# Patient Record
Sex: MALE | Race: BLACK OR AFRICAN AMERICAN | NOT HISPANIC OR LATINO | URBAN - METROPOLITAN AREA
[De-identification: names, ages, dates, MRNs, and addresses within clinical notes are randomized per-mention and may not be internally consistent; named-entity substitution may affect disease eponyms.]

---

## 2017-08-11 ENCOUNTER — EMERGENCY (EMERGENCY)
Facility: HOSPITAL | Age: 22
LOS: 1 days | Discharge: HOME | End: 2017-08-11
Attending: EMERGENCY MEDICINE | Admitting: EMERGENCY MEDICINE
Payer: COMMERCIAL

## 2017-08-11 VITALS
HEART RATE: 67 BPM | SYSTOLIC BLOOD PRESSURE: 134 MMHG | TEMPERATURE: 99 F | OXYGEN SATURATION: 100 % | RESPIRATION RATE: 18 BRPM | DIASTOLIC BLOOD PRESSURE: 72 MMHG

## 2017-08-11 VITALS
DIASTOLIC BLOOD PRESSURE: 68 MMHG | TEMPERATURE: 99 F | HEART RATE: 66 BPM | SYSTOLIC BLOOD PRESSURE: 136 MMHG | OXYGEN SATURATION: 100 % | RESPIRATION RATE: 16 BRPM

## 2017-08-11 PROCEDURE — 99285 EMERGENCY DEPT VISIT HI MDM: CPT

## 2017-08-11 PROCEDURE — 70450 CT HEAD/BRAIN W/O DYE: CPT | Mod: 26

## 2017-08-11 RX ORDER — METOCLOPRAMIDE HCL 10 MG
10 TABLET ORAL ONCE
Qty: 0 | Refills: 0 | Status: COMPLETED | OUTPATIENT
Start: 2017-08-11 | End: 2017-08-11

## 2017-08-11 RX ORDER — SODIUM CHLORIDE 9 MG/ML
1000 INJECTION INTRAMUSCULAR; INTRAVENOUS; SUBCUTANEOUS ONCE
Qty: 0 | Refills: 0 | Status: COMPLETED | OUTPATIENT
Start: 2017-08-11 | End: 2017-08-11

## 2017-08-11 RX ORDER — ACETAMINOPHEN 500 MG
650 TABLET ORAL ONCE
Qty: 0 | Refills: 0 | Status: COMPLETED | OUTPATIENT
Start: 2017-08-11 | End: 2017-08-11

## 2017-08-11 RX ORDER — KETOROLAC TROMETHAMINE 30 MG/ML
15 SYRINGE (ML) INJECTION ONCE
Qty: 0 | Refills: 0 | Status: DISCONTINUED | OUTPATIENT
Start: 2017-08-11 | End: 2017-08-11

## 2017-08-11 RX ADMIN — Medication 650 MILLIGRAM(S): at 13:27

## 2017-08-11 RX ADMIN — Medication 15 MILLIGRAM(S): at 13:31

## 2017-08-11 RX ADMIN — SODIUM CHLORIDE 1000 MILLILITER(S): 9 INJECTION INTRAMUSCULAR; INTRAVENOUS; SUBCUTANEOUS at 13:27

## 2017-08-11 RX ADMIN — Medication 10 MILLIGRAM(S): at 13:31

## 2017-08-11 NOTE — ED ADULT TRIAGE NOTE - CHIEF COMPLAINT QUOTE
c/o headache x past wk ,continuous,changing intensity. denies injury. pain to neck, denies visual problems.  states feels feverish,did not check temp. denies n,v

## 2017-08-11 NOTE — ED PROVIDER NOTE - OBJECTIVE STATEMENT
22M no PMH p/w 1 week constant waxing and waning bandlike headache extending from b/l temples around to occiput and occasional across shoulders/trapezieus. Subjective fever last night, but no documented fever, no NV, no visual changes, no neck pain or stiffness, no URI symptoms. Took 1 dose of 400mg ibuprofen last night without improvement so came to ED today.

## 2017-08-11 NOTE — ED PROVIDER NOTE - MEDICAL DECISION MAKING DETAILS
22M with suspected tension headache; will give symptomatic treatment. Will also obtain CT head noncontrast as this is new onset headache in patient who does not routinely get headaches to r/o mass as etiology of symptoms.

## 2017-08-11 NOTE — ED ADULT NURSE NOTE - OBJECTIVE STATEMENT
pt c.o headache in back of head over the last week. states it is getting worse. thinks he had a fever last night. denies vomiting, blurry vision and head trauma. c.o. feeling dizzy when he gets up in the morning. sl placed medicated as ordered. to ct then rw.

## 2017-08-11 NOTE — ED PROVIDER NOTE - ATTENDING CONTRIBUTION TO CARE
22M o/w healthy presents with headache.  Reports the pain has been ongoing for one week.  Tried motrin x1 yesterday without significant relief.  States HA is b/l temporal area radiating to occiput and down to b/l shoulders.  States pain is worse when he stands up. Reports subjective fever last night, no neck stiffness. No photophobia.  No nausea or vomiting. No dizziness, no numbness or paresthesias. On exam well appearing, nad, PERRL, EOMI,  mmm, OP clear, no meningismus, rrr, lungs clear, abd soft, no rash, 2+ pulses, no edema, alert and oriented, speech clear, no focal neuro deficits.

## 2017-08-12 ENCOUNTER — EMERGENCY (EMERGENCY)
Facility: HOSPITAL | Age: 22
LOS: 1 days | Discharge: HOME | End: 2017-08-12
Attending: EMERGENCY MEDICINE | Admitting: EMERGENCY MEDICINE
Payer: COMMERCIAL

## 2017-08-12 VITALS
DIASTOLIC BLOOD PRESSURE: 80 MMHG | RESPIRATION RATE: 18 BRPM | TEMPERATURE: 98 F | OXYGEN SATURATION: 100 % | SYSTOLIC BLOOD PRESSURE: 125 MMHG | HEART RATE: 59 BPM

## 2017-08-12 VITALS
TEMPERATURE: 98 F | OXYGEN SATURATION: 100 % | SYSTOLIC BLOOD PRESSURE: 150 MMHG | RESPIRATION RATE: 16 BRPM | DIASTOLIC BLOOD PRESSURE: 83 MMHG | HEART RATE: 69 BPM

## 2017-08-12 LAB
ALBUMIN SERPL ELPH-MCNC: 3.9 G/DL — SIGNIFICANT CHANGE UP (ref 3.3–5)
ALP SERPL-CCNC: 50 U/L — SIGNIFICANT CHANGE UP (ref 40–120)
ALT FLD-CCNC: 9 U/L — SIGNIFICANT CHANGE UP (ref 4–41)
AST SERPL-CCNC: 14 U/L — SIGNIFICANT CHANGE UP (ref 4–40)
BASOPHILS # BLD AUTO: 0.02 K/UL — SIGNIFICANT CHANGE UP (ref 0–0.2)
BASOPHILS NFR BLD AUTO: 0.2 % — SIGNIFICANT CHANGE UP (ref 0–2)
BILIRUB SERPL-MCNC: 1.4 MG/DL — HIGH (ref 0.2–1.2)
BUN SERPL-MCNC: 10 MG/DL — SIGNIFICANT CHANGE UP (ref 7–23)
CALCIUM SERPL-MCNC: 8.9 MG/DL — SIGNIFICANT CHANGE UP (ref 8.4–10.5)
CHLORIDE SERPL-SCNC: 101 MMOL/L — SIGNIFICANT CHANGE UP (ref 98–107)
CO2 SERPL-SCNC: 25 MMOL/L — SIGNIFICANT CHANGE UP (ref 22–31)
CREAT SERPL-MCNC: 0.94 MG/DL — SIGNIFICANT CHANGE UP (ref 0.5–1.3)
EOSINOPHIL # BLD AUTO: 0.01 K/UL — SIGNIFICANT CHANGE UP (ref 0–0.5)
EOSINOPHIL NFR BLD AUTO: 0.1 % — SIGNIFICANT CHANGE UP (ref 0–6)
GLUCOSE SERPL-MCNC: 82 MG/DL — SIGNIFICANT CHANGE UP (ref 70–99)
HCT VFR BLD CALC: 33.8 % — LOW (ref 39–50)
HGB BLD-MCNC: 11.4 G/DL — LOW (ref 13–17)
IMM GRANULOCYTES # BLD AUTO: 0.04 # — SIGNIFICANT CHANGE UP
IMM GRANULOCYTES NFR BLD AUTO: 0.3 % — SIGNIFICANT CHANGE UP (ref 0–1.5)
LYMPHOCYTES # BLD AUTO: 1.61 K/UL — SIGNIFICANT CHANGE UP (ref 1–3.3)
LYMPHOCYTES # BLD AUTO: 14 % — SIGNIFICANT CHANGE UP (ref 13–44)
MCHC RBC-ENTMCNC: 27.5 PG — SIGNIFICANT CHANGE UP (ref 27–34)
MCHC RBC-ENTMCNC: 33.7 % — SIGNIFICANT CHANGE UP (ref 32–36)
MCV RBC AUTO: 81.6 FL — SIGNIFICANT CHANGE UP (ref 80–100)
MONOCYTES # BLD AUTO: 1.17 K/UL — HIGH (ref 0–0.9)
MONOCYTES NFR BLD AUTO: 10.2 % — SIGNIFICANT CHANGE UP (ref 2–14)
NEUTROPHILS # BLD AUTO: 8.61 K/UL — HIGH (ref 1.8–7.4)
NEUTROPHILS NFR BLD AUTO: 75.2 % — SIGNIFICANT CHANGE UP (ref 43–77)
NRBC # FLD: 0 — SIGNIFICANT CHANGE UP
PLATELET # BLD AUTO: 152 K/UL — SIGNIFICANT CHANGE UP (ref 150–400)
PMV BLD: 10.3 FL — SIGNIFICANT CHANGE UP (ref 7–13)
POTASSIUM SERPL-MCNC: 3.6 MMOL/L — SIGNIFICANT CHANGE UP (ref 3.5–5.3)
POTASSIUM SERPL-SCNC: 3.6 MMOL/L — SIGNIFICANT CHANGE UP (ref 3.5–5.3)
PROT SERPL-MCNC: 7.1 G/DL — SIGNIFICANT CHANGE UP (ref 6–8.3)
RBC # BLD: 4.14 M/UL — LOW (ref 4.2–5.8)
RBC # FLD: 12.8 % — SIGNIFICANT CHANGE UP (ref 10.3–14.5)
SODIUM SERPL-SCNC: 139 MMOL/L — SIGNIFICANT CHANGE UP (ref 135–145)
WBC # BLD: 11.46 K/UL — HIGH (ref 3.8–10.5)
WBC # FLD AUTO: 11.46 K/UL — HIGH (ref 3.8–10.5)

## 2017-08-12 PROCEDURE — 99284 EMERGENCY DEPT VISIT MOD MDM: CPT

## 2017-08-12 RX ORDER — KETOROLAC TROMETHAMINE 30 MG/ML
15 SYRINGE (ML) INJECTION ONCE
Qty: 0 | Refills: 0 | Status: DISCONTINUED | OUTPATIENT
Start: 2017-08-12 | End: 2017-08-12

## 2017-08-12 RX ORDER — METOCLOPRAMIDE HCL 10 MG
10 TABLET ORAL ONCE
Qty: 0 | Refills: 0 | Status: COMPLETED | OUTPATIENT
Start: 2017-08-12 | End: 2017-08-12

## 2017-08-12 RX ORDER — VALPROIC ACID (AS SODIUM SALT) 250 MG/5ML
500 SOLUTION, ORAL ORAL ONCE
Qty: 0 | Refills: 0 | Status: COMPLETED | OUTPATIENT
Start: 2017-08-12 | End: 2017-08-12

## 2017-08-12 RX ORDER — ACETAMINOPHEN 500 MG
1000 TABLET ORAL ONCE
Qty: 0 | Refills: 0 | Status: COMPLETED | OUTPATIENT
Start: 2017-08-12 | End: 2017-08-12

## 2017-08-12 RX ORDER — SODIUM CHLORIDE 9 MG/ML
1000 INJECTION INTRAMUSCULAR; INTRAVENOUS; SUBCUTANEOUS ONCE
Qty: 0 | Refills: 0 | Status: COMPLETED | OUTPATIENT
Start: 2017-08-12 | End: 2017-08-12

## 2017-08-12 RX ORDER — DIPHENHYDRAMINE HCL 50 MG
25 CAPSULE ORAL ONCE
Qty: 0 | Refills: 0 | Status: COMPLETED | OUTPATIENT
Start: 2017-08-12 | End: 2017-08-12

## 2017-08-12 RX ADMIN — Medication 25 MILLIGRAM(S): at 16:58

## 2017-08-12 RX ADMIN — Medication 220 MILLIGRAM(S): at 18:51

## 2017-08-12 RX ADMIN — Medication 15 MILLIGRAM(S): at 18:39

## 2017-08-12 RX ADMIN — Medication 10 MILLIGRAM(S): at 16:59

## 2017-08-12 RX ADMIN — Medication 15 MILLIGRAM(S): at 18:51

## 2017-08-12 RX ADMIN — Medication 400 MILLIGRAM(S): at 16:58

## 2017-08-12 RX ADMIN — SODIUM CHLORIDE 1000 MILLILITER(S): 9 INJECTION INTRAMUSCULAR; INTRAVENOUS; SUBCUTANEOUS at 16:59

## 2017-08-12 NOTE — CONSULT NOTE ADULT - SUBJECTIVE AND OBJECTIVE BOX
Pt is a 21 yo M with a PMH of Asthma presenting with 1 week of headache, seen in ED one day ago for the same reason. Pt describes headache as 10/10 at its worst and 3/10 at it's least, constant, worsened by sound (but not light) and associated with nausea and vomiting after eating. Pt describes the pain to be pressure like and then sharp if he goes from a laying to a standing position quickly. Pt denies any fevers, chills, numbness, weakness, stiffness, CP, SOB, back pain, changes in urination or BMs, previous episode similar to this or any family hx of HA disorders, strokes, MI, PE or any other clots.        MEDICATIONS  (STANDING):    MEDICATIONS  (PRN):    PAST MEDICAL & SURGICAL HISTORY:  Asthma  No significant past surgical history    FAMILY HISTORY:    Allergies    No Known Allergies    Intolerances        SHx - No smoking, No ETOH, No drug abuse      Review of Systems:  CONSTITUTIONAL:   HEENT:  No visual loss, blurred vision, double vision.  CARDIOVASCULAR:  No chest pain, chest pressure or chest discomfort.  RESPIRATORY:  No shortness of breath, cough.  GASTROINTESTINAL:  Anorexia due to nausea and vomiting, no diarrhea. No abdominal pain.  GENITOURINARY:  NO dysuria. No increased frequency. No retention. No incontinence.  NEUROLOGICAL:  See HPI  MUSCULOSKELETAL:  No muscle, back pain, joint pain or stiffness.        Vital Signs Last 24 Hrs  T(C): 36.8 (12 Aug 2017 14:14), Max: 36.8 (12 Aug 2017 14:14)  T(F): 98.2 (12 Aug 2017 14:14), Max: 98.2 (12 Aug 2017 14:14)  HR: 69 (12 Aug 2017 14:14) (69 - 69)  BP: 150/83 (12 Aug 2017 14:14) (150/83 - 150/83)  BP(mean): --  RR: 16 (12 Aug 2017 14:14) (16 - 16)  SpO2: 100% (12 Aug 2017 14:14) (100% - 100%)    General Exam:   General appearance: No acute distress    Neurological Exam:  Mental Status: Orientated to self, date and place.  Attention intact.  No dysarthria. Speech fluent.  Cranial Nerves:   PERRL, EOMI, VFF, no nystagmus.    CN V1-3 intact to light touch .  No facial asymmetry. Tongue, uvula and palate midline.  Sternocleidomastoid and Trapezius intact bilaterally.    Motor:   Tone: normal.                  Strength:     [] Upper extremity                      Delt       Bicep    Tricep                                                  R         5/5        5/5        5/5       5/5                                               L          5/5        5/5        5/5       5/5  [] Lower extremity                       HF          KE          KF        DF         PF                                               R        5/5 5/5 5/5 5/5       5/5                                               L         5/5 5/5 5/5 5/5        5/5  Pronator drift: none                 Dysmetria: None to finger-nose-finger b/l  Tremor: No resting, postural or action tremor.  No myoclonus.    Sensation: intact to light touch thorughout    Deep Tendon Reflexes:              Biceps          BR       Patellar        Ankle       Babinski                                         R       2+                2+        2+               2+           downgoing                                         L        2+                2+        2+               2+           downgoing    Gait: normal.      Brukotaki and Marcelino's negative                Radiology  < from: CT Head No Cont (08.11.17 @ 14:04) >  No acute intracranial abnormality.

## 2017-08-12 NOTE — ED ADULT TRIAGE NOTE - CHIEF COMPLAINT QUOTE
Pt seen yesterday for headache c/o migraine, loss of appetite and vomiting. Pt states the pain radiates down the neck to the posterior portion of both shoulders minimally relieved by Tylenol and Motrin. Pt states the pain is worse when he stands and lifts his head back, denies visual changes. Resp even and unlabored.

## 2017-08-12 NOTE — ED PROVIDER NOTE - MEDICAL DECISION MAKING DETAILS
23 yo M hx asthma presenting with 1 week HA with nausea vomiting, CT negative. S/P reglan, benadryl, acetaminophen and neuro c/s. likely migraine HA. HA improved to 2-3/10 from 9/10, pt okay to DC with naproxen 500 mg BID and neuro f/u.

## 2017-08-12 NOTE — CONSULT NOTE ADULT - ASSESSMENT
Pt is a 21 yo M with a PMH of Asthma presenting with 1 week of headache, seen in ED one day ago for the same reason. Based on hx and non-focal exam HA appears to be migrainous. Pt is a 21 yo M with a PMH of Asthma presenting with 1 week of headache, seen in ED one day ago for the same reason. Based on hx and non-focal exam HA appears to be migrainous.    Recommendations:  - Depakote 500mg IV infusion over 15 minutes  - Zofran 4mg IV  - Toradol 15mg IV  - Naproxen 500mg PO BID for 7 days as outpatient  - Neuro F/U as outpatient Pt is a 23 yo M with a PMH of Asthma presenting with 1 week of headache, seen in ED one day ago for the same reason. Based on hx and non-focal exam HA appears to be migrainous.    Recommendations:  - Depakote 500mg IV infusion over 15 minutes  - Toradol 15mg IV  - Naproxen 500mg PO BID for 7 days as outpatient  - Neuro F/U as outpatient

## 2017-08-12 NOTE — ED PROVIDER NOTE - ATTENDING CONTRIBUTION TO CARE
I performed a face to face evaluation of this patient and performed a full history and physical examination on the patient.  I agree with the resident's history, physical examination, and plan of the patient.  21yo M no significant PMH p/w 1wk of intractable HA, weakness, fatigue, patient seen in ED yesterday CT head (-), better after IV tylenol, but symptoms return prompting return visit, patient reports chills without fever, no visual changes, no uri symptoms, has h/o occasional HA in past but never this painful, no head injury, no travel  On exam awake & alert, mild distress, NC/AT, PERRL, EOMI, dry mm, lungs CTAB no wheeze no crackle, RRR, abdomen soft NT/ND no rebound no guarding, no CVA tenderness, no edema, no calf tenderness, 2+ pulses b/l, neuro A&Ox3, no focal deficits, no nuchal rigidity, skin warm and dry no rash

## 2017-08-12 NOTE — ED PROVIDER NOTE - PROGRESS NOTE DETAILS
Pt improved on exam. Appreciate neuro c/s. Neuro recommended depakote 500 IV and toradol IV before D/C. Pt feels better on exam and feels ready to go home.

## 2017-08-12 NOTE — ED PROVIDER NOTE - OBJECTIVE STATEMENT
23 yo M hx asthma presenting with 1 week worsening headache, seen in ED one day ago. 9/10 headache with new onset nausea and one episode vomiting today. Phonophobia. No photophobia. Worse with standing, better with laying flat. Described as pressure like headache radiating into neck. Reports neck pain but no stiffness. No fevers. + chills.

## 2021-12-17 ENCOUNTER — EMERGENCY (EMERGENCY)
Facility: HOSPITAL | Age: 26
LOS: 1 days | Discharge: HOME | End: 2021-12-17
Attending: HOSPITALIST | Admitting: HOSPITALIST
Payer: SELF-PAY

## 2021-12-17 VITALS
OXYGEN SATURATION: 100 % | RESPIRATION RATE: 16 BRPM | HEART RATE: 85 BPM | SYSTOLIC BLOOD PRESSURE: 156 MMHG | DIASTOLIC BLOOD PRESSURE: 102 MMHG

## 2021-12-17 VITALS
HEART RATE: 74 BPM | TEMPERATURE: 98 F | SYSTOLIC BLOOD PRESSURE: 145 MMHG | OXYGEN SATURATION: 100 % | DIASTOLIC BLOOD PRESSURE: 80 MMHG | RESPIRATION RATE: 16 BRPM

## 2021-12-17 LAB
ALBUMIN SERPL ELPH-MCNC: 4.3 G/DL — SIGNIFICANT CHANGE UP (ref 3.3–5)
ALP SERPL-CCNC: 68 U/L — SIGNIFICANT CHANGE UP (ref 40–120)
ALT FLD-CCNC: 10 U/L — SIGNIFICANT CHANGE UP (ref 4–41)
ANION GAP SERPL CALC-SCNC: 14 MMOL/L — SIGNIFICANT CHANGE UP (ref 7–14)
AST SERPL-CCNC: 17 U/L — SIGNIFICANT CHANGE UP (ref 4–40)
BASOPHILS # BLD AUTO: 0.04 K/UL — SIGNIFICANT CHANGE UP (ref 0–0.2)
BASOPHILS NFR BLD AUTO: 0.4 % — SIGNIFICANT CHANGE UP (ref 0–2)
BILIRUB SERPL-MCNC: 0.9 MG/DL — SIGNIFICANT CHANGE UP (ref 0.2–1.2)
BUN SERPL-MCNC: 8 MG/DL — SIGNIFICANT CHANGE UP (ref 7–23)
CALCIUM SERPL-MCNC: 9.8 MG/DL — SIGNIFICANT CHANGE UP (ref 8.4–10.5)
CHLORIDE SERPL-SCNC: 97 MMOL/L — LOW (ref 98–107)
CO2 SERPL-SCNC: 26 MMOL/L — SIGNIFICANT CHANGE UP (ref 22–31)
CREAT SERPL-MCNC: 0.84 MG/DL — SIGNIFICANT CHANGE UP (ref 0.5–1.3)
EOSINOPHIL # BLD AUTO: 0.06 K/UL — SIGNIFICANT CHANGE UP (ref 0–0.5)
EOSINOPHIL NFR BLD AUTO: 0.6 % — SIGNIFICANT CHANGE UP (ref 0–6)
GLUCOSE SERPL-MCNC: 86 MG/DL — SIGNIFICANT CHANGE UP (ref 70–99)
HCT VFR BLD CALC: 42.3 % — SIGNIFICANT CHANGE UP (ref 39–50)
HGB BLD-MCNC: 14.1 G/DL — SIGNIFICANT CHANGE UP (ref 13–17)
IANC: 6.39 K/UL — SIGNIFICANT CHANGE UP (ref 1.5–8.5)
IMM GRANULOCYTES NFR BLD AUTO: 0.3 % — SIGNIFICANT CHANGE UP (ref 0–1.5)
LYMPHOCYTES # BLD AUTO: 1.93 K/UL — SIGNIFICANT CHANGE UP (ref 1–3.3)
LYMPHOCYTES # BLD AUTO: 20.7 % — SIGNIFICANT CHANGE UP (ref 13–44)
MCHC RBC-ENTMCNC: 26.9 PG — LOW (ref 27–34)
MCHC RBC-ENTMCNC: 33.3 GM/DL — SIGNIFICANT CHANGE UP (ref 32–36)
MCV RBC AUTO: 80.6 FL — SIGNIFICANT CHANGE UP (ref 80–100)
MONOCYTES # BLD AUTO: 0.86 K/UL — SIGNIFICANT CHANGE UP (ref 0–0.9)
MONOCYTES NFR BLD AUTO: 9.2 % — SIGNIFICANT CHANGE UP (ref 2–14)
NEUTROPHILS # BLD AUTO: 6.39 K/UL — SIGNIFICANT CHANGE UP (ref 1.8–7.4)
NEUTROPHILS NFR BLD AUTO: 68.8 % — SIGNIFICANT CHANGE UP (ref 43–77)
NRBC # BLD: 0 /100 WBCS — SIGNIFICANT CHANGE UP
NRBC # FLD: 0 K/UL — SIGNIFICANT CHANGE UP
PLATELET # BLD AUTO: 347 K/UL — SIGNIFICANT CHANGE UP (ref 150–400)
POTASSIUM SERPL-MCNC: 3.7 MMOL/L — SIGNIFICANT CHANGE UP (ref 3.5–5.3)
POTASSIUM SERPL-SCNC: 3.7 MMOL/L — SIGNIFICANT CHANGE UP (ref 3.5–5.3)
PROT SERPL-MCNC: 8.2 G/DL — SIGNIFICANT CHANGE UP (ref 6–8.3)
RBC # BLD: 5.25 M/UL — SIGNIFICANT CHANGE UP (ref 4.2–5.8)
RBC # FLD: 12.8 % — SIGNIFICANT CHANGE UP (ref 10.3–14.5)
SODIUM SERPL-SCNC: 137 MMOL/L — SIGNIFICANT CHANGE UP (ref 135–145)
WBC # BLD: 9.31 K/UL — SIGNIFICANT CHANGE UP (ref 3.8–10.5)
WBC # FLD AUTO: 9.31 K/UL — SIGNIFICANT CHANGE UP (ref 3.8–10.5)

## 2021-12-17 PROCEDURE — 99284 EMERGENCY DEPT VISIT MOD MDM: CPT

## 2021-12-17 RX ORDER — SODIUM CHLORIDE 9 MG/ML
1000 INJECTION INTRAMUSCULAR; INTRAVENOUS; SUBCUTANEOUS ONCE
Refills: 0 | Status: COMPLETED | OUTPATIENT
Start: 2021-12-17 | End: 2021-12-17

## 2021-12-17 RX ADMIN — SODIUM CHLORIDE 1000 MILLILITER(S): 9 INJECTION INTRAMUSCULAR; INTRAVENOUS; SUBCUTANEOUS at 18:11

## 2021-12-17 NOTE — ED PROVIDER NOTE - NSFOLLOWUPINSTRUCTIONS_ED_ALL_ED_FT
Follow up with your PMD within 48-72 hours.      Rest, increase fluids.     Take tylenol 650mg every 6 hours for pain or temp greater than 99.9.     Take Pepcid 20mg 1 tab 2x/day for 1-2 days as needed for epigastric burning.      Eat bland, small meals as tolerated.  Worsening or continued fever, chills, weakness, nausea, vomiting, abdominal pain return to ER

## 2021-12-17 NOTE — ED PROVIDER NOTE - ATTENDING CONTRIBUTION TO CARE
26M with hx fo daily marijuana use for the past 10 years p/w diarrhea for the past 2 weeks. patient states he stopped smoking marijuana in the beginning of the month and started having diarrhea on 12/3. has been having sx since then. only vomited once, no fever or abdominal pain. stool is watery and brown. no hx of similar in the past. went to urgent care and was prescribed doxycycline which hasn't been improving sx. no travel or sick contacts.    ***GEN - NAD; well appearing; A+O x3 ***HEAD - NC/AT ***EYES/NOSE - PERRL, EOMI, mucous membranes moist, no discharge ***THROAT: Oral cavity and pharynx normal. No inflammation, swelling, exudate, or lesions.  ***NECK: Neck supple, non-tender without lymphadenopathy, no masses, no thyromegaly.   ***PULMONARY - CTA b/l, symmetric breath sounds. ***CARDIAC -s1s2, RRR, no M,G,R  ***ABDOMEN - +BS, ND, NT, soft, no guarding, no rebound, no masses   ***BACK - no CVA tenderness, Normal  spine ***EXTREMITIES - symmetric pulses, 2+ dp, capillary refill < 2 seconds, no clubbing, no cyanosis, no edema ***SKIN - no rash or bruising   ***NEUROLOGIC - alert, CN 2-12 intact    MDM: 26M with diarrhea for the past 2 weeks. well appearing. tolerating po intake. labs, fluids, stool culture if patient has a BM here. no cdiff risk factors. outpt f/u with GI.

## 2021-12-17 NOTE — ED PROVIDER NOTE - PROGRESS NOTE DETAILS
ANITA Mejia: patient was signed out to me to follow up labs, patient has not given stool sample will have him follow up with GI.

## 2021-12-17 NOTE — ED PROVIDER NOTE - PHYSICAL EXAMINATION
Gen: NAD, AOx3, able to make needs known, non-toxic  Head: NCAT  HEENT: EOMI, oral mucosa moist, normal conjunctiva  Lung: CTAB, no respiratory distress, no wheezes/rhonchi/rales B/L, speaking in full sentences  CV: RRR, no M/R/G, pulses bilaterally   Abd: soft, NTND, no guarding, no CVA tenderness  MSK: no visible bony deformities  Neuro: No focal sensory or motor deficits  Skin: Warm, well perfused, no rash  Psych: normal affect

## 2021-12-17 NOTE — ED PROVIDER NOTE - CLINICAL SUMMARY MEDICAL DECISION MAKING FREE TEXT BOX
Kye, PGY1 - diarrhea for a couple of weeks, consider GI bug, consider marijuana withdrawal. labs, stool culture, fluids, reassess. *The above represents an initial assessment/impression. Please refer to progress notes for potential changes in patient clinical course*

## 2021-12-17 NOTE — ED ADULT TRIAGE NOTE - MODE OF ARRIVAL
Spoke with patient and sent message via portal with information for pain clinic for blood patch.  Pt is aware to call them for an appt.   [facility]

## 2021-12-17 NOTE — ED PROVIDER NOTE - OBJECTIVE STATEMENT
25yo man, extensive marijuana use which he stopped suddenly a few days before symptoms started, presenting with diarrhea since 12/2. Patient had abdominal pain on 12/3, has had one 100F fever on Wednesday, N/V yesterday after forcing himself to eat a big meal. No GI family hx. Went to Urgent Care 12/14 and was given doxycyline. Denies chest pain, SOB.

## 2021-12-17 NOTE — ED ADULT TRIAGE NOTE - CHIEF COMPLAINT QUOTE
c/o abdominal pain , N/V/D, fever/chills since 12/3/21. C/o Headaches x 2 weeks. Went to UC 12/14 and prescribed antibiotics by Urgent Care but came for second opinion since symptoms still persisting. Pt states he recently stopped his daily use of Marijuana since 12/3/21. Hx asthma as child.

## 2021-12-17 NOTE — ED ADULT NURSE NOTE - OBJECTIVE STATEMENT
Receive pt. in Intake room 1 alert and oriented x 4, presenting to the ER with complaints of nausea, vomiting and diarrhea . Pt. stated " I had diarrhea, nausea and some vomiting hat started 2 weeks ago after I ate some pizza". No c/o pain , nausea or vomiting at present, pt. states that he feels weak. Labs sent, normal saline infusing as ordered, call bell place within reach.

## 2021-12-17 NOTE — ED PROVIDER NOTE - PATIENT PORTAL LINK FT
You can access the FollowMyHealth Patient Portal offered by [facility] by registering at the following website: [email or web address]. By joining [facility]’s FollowMyHealth portal, you will also be able to view your health information using other applications (apps) compatible with our system.

## 2022-11-05 ENCOUNTER — EMERGENCY (EMERGENCY)
Facility: HOSPITAL | Age: 27
LOS: 1 days | Discharge: HOME | End: 2022-11-05
Admitting: EMERGENCY MEDICINE
Payer: SELF-PAY

## 2022-11-05 VITALS
SYSTOLIC BLOOD PRESSURE: 145 MMHG | OXYGEN SATURATION: 100 % | HEART RATE: 58 BPM | DIASTOLIC BLOOD PRESSURE: 80 MMHG | RESPIRATION RATE: 17 BRPM | TEMPERATURE: 98 F

## 2022-11-05 PROCEDURE — 99283 EMERGENCY DEPT VISIT LOW MDM: CPT | Mod: 25

## 2022-11-05 PROCEDURE — 12011 RPR F/E/E/N/L/M 2.5 CM/<: CPT

## 2022-11-05 RX ORDER — TETANUS TOXOID, REDUCED DIPHTHERIA TOXOID AND ACELLULAR PERTUSSIS VACCINE, ADSORBED 5; 2.5; 8; 8; 2.5 [IU]/.5ML; [IU]/.5ML; UG/.5ML; UG/.5ML; UG/.5ML
0.5 SUSPENSION INTRAMUSCULAR ONCE
Refills: 0 | Status: COMPLETED | OUTPATIENT
Start: 2022-11-05 | End: 2022-11-05

## 2022-11-05 RX ADMIN — TETANUS TOXOID, REDUCED DIPHTHERIA TOXOID AND ACELLULAR PERTUSSIS VACCINE, ADSORBED 0.5 MILLILITER(S): 5; 2.5; 8; 8; 2.5 SUSPENSION INTRAMUSCULAR at 15:21

## 2022-11-05 NOTE — ED PROVIDER NOTE - CLINICAL SUMMARY MEDICAL DECISION MAKING FREE TEXT BOX
Pt is a 26 y/o M PMHx asthma p/w laceration today.  -- laceration; not clinically concerning for ICH; not clinically concerning for fractures -- laceration repair

## 2022-11-05 NOTE — ED PROVIDER NOTE - NSFOLLOWUPINSTRUCTIONS_ED_ALL_ED_FT
Follow up with your PMD within 48-72 hours for wound check.  Dermabond will fall off on its own; please do not pick at the dermabond. Please return immediately to the Emergency Department if you have any worsening or change in your condition or if you have any other problems or concerns at all, including but not limited to any increased pain, redness, streaking (red lines), swelling, fever, chills.

## 2022-11-05 NOTE — ED PROVIDER NOTE - DISCHARGE REVIEW MATERIAL PRESENTED
head CT.  IVF.  May need blood patch.  Best rest for now.  Eval for possible blood patch.  F/u labs. head CT.  IVF.  May need blood patch.  Bed rest for now.  Eval for possible blood patch.  F/u labs. .

## 2022-11-05 NOTE — ED PROVIDER NOTE - NEUROLOGICAL LEVEL OF CONSCIOUSNESS
Patient:   Reddy Carlos            MRN: [id number]            FIN: [id number]              Age:   53 years     Sex:  MALE     :  63   Associated Diagnoses:   None   Author:   Franklin Schirmer     Subjective   Patient seen and examined. EMR is reviewed. This is a F/U for ESRD  (-) CP, (-) SOB  Tmax 39.4 (19)  Blood C/S x 2 (19): Gram (+) cocci in clusters  - Plan to remove Tunneled IJ HD Permcath (19) and exchange with Temporary HD catheter     Health Status   Allergies: Allergic Reactions (All)  NKA   Current medications:  (Selected)   Inpatient Medications  Ordered  Brilinta (ticagrelor) oral 60 mg  tablet: 90 mg, 1 tab, Oral, BID  Sodium Chloride 0.9% 1,000 mL: 40 mL/hr, IV  [RESTRICTED] DAPTOmycin  IVPB (Cubicin): 740 mg, 100 mL/hr, IVPB, Q48H  acetaminophen (Tylenol). : 650 mg, 2 tab, Oral, Q6H, PRN: pain mild  aspirin oral 81 mg chewable tablet: 81 mg, 1 tab, Chewed, Daily  calcitriol (vitamin D3) oral 0.25 mcg capsule: 1 mcg, 4 cap, Oral, Q Mon, Wed & Fri  cefepime intermittent infusion [30 minute] (Maxipime). : 1,000 mg, 200 mL/hr, IVPB, Q24H  docusate sodium (Colace). : 100 mg, 1 cap, Oral, BID, PRN: constipation  heparin subcutaneous injection 5,000 unit: 5,000 unit, 1 mL, Subcutaneous, Q8H (variable)  ondansetron injection 2 mg/mL (Zofran): 4 mg, 2 mL, Slow IV Push, Q6H, PRN: nausea or vomiting  pantoprazole oral 40 mg DR tablet: 40 mg, 1 tab, Oral, Daily  sevelamer carbonate oral 800 mg tablet: 2,400 mg, 3 tab, Oral, TID [with meals]     Objective   Intake and Output   (Inserted Image. Unable to display)         24 hour intake: Total  1,511  ml        24 hour output:  Total  1,000  ml   VS/Measurements   Vital Signs   19 05:30 CST Temperature - VS 37.8 deg_C  Normal    Temperature Source - VS Oral    Heart/Pulse Rate 98  HI    Pulse Source Monitor    Respiration Rate 18 breaths/min  HI    SpO2 99 %  Normal    NIBP Systolic 111  Normal    NIBP Diastolic 68  Normal NIBP MAP 82  Normal   12/17/19 23:42 CST Temperature - VS 38.1 deg_C  HI    NIBP MAP Manual Entry 78   12/17/19 20:45 CST Temperature - VS 37.2 deg_C  Normal   12/17/19 19:55 CST Temperature - VS 36.7 deg_C  Normal   12/17/19 16:20 CST Temperature - VS 37.2 deg_C  Normal   12/17/19 00:00 CST Temperature - VS 39.4 deg_C  HI       General:  No acute distress. Eye:  Normal conjunctiva. HENT:  Normocephalic. Neck:  Supple. Respiratory:  Lungs are clear to auscultation. Cardiovascular:  Regular rhythm, Tachycardia. Gastrointestinal:  Soft, Normal bowel sounds, Obese. Integumentary:  Warm, Dry. Neurologic:  Alert. Results Review   General results   Today's results   12/18/19 05:16 CST Sodium 135 mmol/L    Potassium 4.8 mmol/L    Chloride 97 mmol/L  LOW    Carbon Dioxide (CO2) 27 mmol/L    Anion Gap 16 mmol/L    BUN 46 mg/dL  HI    Creatinine 12.70 mg/dL  HI    BUN/Creatinine Ratio 4  LOW    Calcium 9.0 mg/dL    Glucose Level 89 mg/dL    GOT/AST 13 unit/L    GPT/ALT 13 unit/L    Alk Phosphatase 132 unit/L  HI    Bilirubin Total 0.5 mg/dL    Protein, Total 8.2 gm/dL    Albumin 2.6 gm/dL  LOW    Globulin 5.6 gm/dL  HI    A/G Ratio 0.5  LOW    WBC 6.0 THOUSAND/mcL    RBC 3.15 MILLION/mcL  LOW    Hemoglobin 7.9 gm/dL  LOW    Hematocrit 27.4 %  LOW    MCV 87.0 fL    MCH 25.1 pg  LOW    MCHC 28.8 gm/dL  LOW    RDW-CV 18.0 %  HI    Platelet 733 THOUSAND/mcL  LOW    Neutrophils 76 %    Abs Neut 4.6 THOUSAND/mcL    Lymph 8 %    Absolute Lymph 0.5 THOUSAND/mcL  LOW    Monocytes 15 %    Abs Mono 0.9 THOUSAND/mcL    Eosinophils 1 %    Absolute Eos 0.0 THOUSAND/mcL  LOW    Basophils 0 %    Absolute Baso 0.0 THOUSAND/mcL    Percent Immature Granulocytes 0 %    Absolute Immature Granulocytes 0.0 THOUSAND/mcL    NRBC 0 /100 WBC       Impression and Plan   1. CRF/ ESRD (on maintenance hemodialysis: TThS at [facility] under Dr. John Singh) most likely secondary to the following:  a.  Hx of HTN.  b. DM 2.   b1. DM Vasculoneuropathy/ PVD. S/P L Foot 5th digit amputation. L Foot Xray (3/14/17): The left 5th metatarsal and toe have been resected. There has been resection at the base of the 1st metatarsal.  Hallux sesamoids remain. Surgical screw cuboid. The Lisfranc joint is disorganized. Malunited fracture proximal 2nd toe(?) There has been resection of the distal end of the proximal phalanx of the 4th toe. Underlying subcutaneous radiolucency identified consistent with an ulceration. S/P Debridement (3/15/17). - L Foot Xray (3/7/19): Again noted is status post amputation of the first and fifth rays as seen on the prior study. There are healed fracture deformities of the second and third metatarsal bones as well as the proximal phalanx of the second toe. A metallic tendon anchor cord is again noted on the coracoid bone. Degenerative changes are noted involving the fourth metatarsophalangeal joint. There is a 3.8 cm soft tissue ulceration along the  lateral aspect of the midfoot. There is no evidence of bone destruction to suggest osteomyelitis. - L Foot MRI (3/10/19): Postoperative changes consistent with transmetatarsal amputation at the base of the 1st metatarsal.  Medial and lateral sesamoid bones also remain. There is also been disarticulation of the 5th digit at the tarsometatarsal joint. Chronic appearing fracture deformity is demonstrated at the proximal 2nd and mid 3rd metatarsal shafts. Bone marrow edema demonstrated at the base of the remaining 4th metatarsal and to a  lesser extent the opposing cuboid where there is associated T1 hypointensity and indistinctness of the intervening cortex, consistent with osteomyelitis.   There is also edema at the base of the remaining 3rd metatarsal and opposing lateral cuneiform with very subtle T1 hypointensity there is mild bone marrow edema also within the base of the 2nd, middle and medial cuneiforms, and visualized navicular without significant corresponding T1  hypointensity. Severe degenerative changes demonstrated across the tarsometatarsal joints with possible component of Charcot arthropathy. Extensive soft tissue edema about the dorsum and lateral margin of the foot as well as in the intrinsic musculature of the foot. No well-defined fluid collection or abscess. S/P L Foot incision and debridement of all non-viable necroti and infected soft tissues and bone with a L Chopart's amputation, tendon achilles lengthening, graft application (0/02/57)  - L Foot Xray (3/13/19): Status post forefoot/midfoot amputation noted. The remaining regional bones are intact. The joints are preserved. No lytic or blastic lesions are identified. There is no evidence of bone destruction. Local soft tissue swelling and subcutaneous emphysema is noted. L Foot Xray (4/21/19): Moderate soft tissue swelling and soft tissue gas anterior to the left hindfoot, with findings suggesting skin surface wound or ulceration. Cortical attenuation worrisome for osteomyelitis at the anterior aspect of the talus anterior process, anterior aspect of calcaneus posterior process, and posterior medial aspect of calcaneus posterior process. B LE US (4/22/19): No deep venous thrombosis in either lower extremity. L Foot MRI (4/24/19): Osteomyelitis of the calcaneus extending from the anterior process posteriorly, probably extending into the calcaneal tuberosity. T2 hyperintense 0.8 cm lesion in the calcaneus at the level of the angle of [place]issane is new since 2017 and highly suspicious for an intraosseous abscess. Indeterminate bone marrow edema in the plantar tibial tuberosity at the plantar fascial insertion, possibly subenthesial marrow edema  although it is difficult to entirely exclude early osteomyelitis as there is a thin tract of fluid tracking along the proximal plantar fascia. Osteomyelitis of the talar head and neck.  2.8 cm abscess along the plantar calcaneal body cortex. Sinus tract from the plantar lateral amputation to the calcaneus anterior process with adjacent cellulitis. Nonspecific small tibiotalar joint effusion. No erosions at the tibiotalar joint at this time. Cannot exclude septic arthritis. B EMILY (4/26/19): Normal ankle brachial indices bilaterally. No evidence of significant arterial insufficiency. Normal right toe brachial indices. No evidence of significant digital ischemia. L LE US (7/6/19): (-) for DVT  L LE US (7/6/19): Normal color Doppler signal, compression and augmentation were demonstrated in the common femoral, superficial femoral, deep femoral, popliteal,  and peroneal veins of the lower extremities. The left posterior tibial vein was not clearly visualized. L Foot MRI (7/8/19): Progressive osteomyelitis in the calcaneus anterior process extends posteriorly into the calcaneal tuberosity. Progressive osteomyelitis in the talar head and neck. Progressive 4.8 cm fluid collection adjacent to the calcaneus anterior tuberosity is likely an abscess, contiguous with a smaller amount of fluid adjacent to the talar head. T2 intermediate to hyperintense linear tract extending from the amputation stump skin  to the fluid collection is likely a sinus tract. Cellulitis. Mild tenosynovitis of flexor hallucis longus and the peroneal tendons is nonspecific, possibly an infectious tenosynovitis. Small tibiotalar joint effusion is nonspecific.  - S/P I and D of R Foot abscess (7/9/19)  - S/P I and D of L Foot (7/9/19, 7/11/19)  c. CHF. 2D ECHO (3/8/19): LVEF 60-65%  2D ECHO (4/22/19): LVEF 60-65%, Trileaflet AV  Chest CT (4/22/19): Moderate to prominent pulmonary vascular congestion. Faint bilateral perihilar ground-glass interstitial densities. Mild lower lobe subsegmental atelectasis in the posterior lung bases. No focal parenchymal lesions. No pleural effusions. CXR (12/17/19): There is vascular congestion.   There is no consolidation, pleural effusion or pneumothorax. d. CT (4/22/19): Moderate bilateral renal atrophy. Faint left renal papillary calcifications. Mild bilateral perinephric stranding. CT (9/27/19): Bilateral kidneys are atrophic, but no hydronephrosis or renal calculi. CT (12/17/19): No evidence of hydronephrosis or hydroureter. Bilateral renal cortical atrophy is noted. No urinary tract calculus. Urinary bladder is unremarkable. Dialysis tomorrow 12/19/19 Thursday  2. Hx of MRSA Catheter related sepsis (7/6/19, 9/27/19)  Previously treated with Daptomycin  S/P Left IJ venogram with venoplasty and exchange of L IJ HD Permcath (10/9/19)  - This is currently being used for hemodialysis  With his history of recurrent MRSA HD Catheter related sepsis, certainly the possibility of the HD catheter being infected again is a primary consideration. He was also seen by ID/ Dr Pat Yeung during his last hospitalization back in Oct 2019. F/U Pancultures  IV Abx  ID F/U  Tmax 39.4 (12/17/19)  Blood C/S x 2 (12/17/19): Gram (+) cocci in clusters  - Plan to remove Tunneled IJ HD Permcath (12/18/19) and exchange with Temporary HD catheter  2. Anemia secondary to Chronic illness/ CKD  TSAT 30% (11/7/19)  Hgb 7.9 (12/18/19); 9.9 (12/17/19)  Start (12/19/19) EPO 4000 q TThS  3. 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 iPTH 1331 (11/7/19)  4. Thrombosed L UE AVF  5. Vit D deficiency. 6. Hx of CVA. 7. Obesity; HARPREET. 8. PCN allergy  9. Hyponatremia most likely related to #1  Na 135 (12/18/19); 131 (12/17/19)  10. Head CT (12/17/19): No acute intra- or extra-axial fluid collections are identified. There is mild cerebral volume loss with associated ex vacuo ventricular dilatation. The basilar cisterns are patent. No mass effect or midline shift is seen. The gray-white matter differentiation is normal. Periventricular white matter hypoattenuation is indicative of chronic small vessel ischemic disease. There is vascular calcification of the carotid  siphons.  The visualized portions of the orbits, paranasal sinuses, and mastoids appear normal. No acute fracture is identified. 11. Hypoalbuminemia       Assessment and Plan:  Orders     Orders   Laboratory:  CBC WITH AUTOMATED DIFFERENTIAL [CBCA] (Order Processing): Priority- Tomorrow AM DRAW (4 To 6 AM), Blood, 12/19/19 06:00 CST  BMP (Order Processing): Priority- Tomorrow AM DRAW (4 To 6 AM), Blood, 12/19/19 06:00 CST  Dialysis:  Hemodialysis Treatment (Order Processing): 12/19/19 06:00 CST, Lkcqjko-Vvvipvvy-Bglspoos, Time Length (Hrs/Min) : 4, Dialyzer : 180 NR, Dialysis Solution : Dialysate Premix, K : 2, Ca : 2.5, BiCarb : 35, Na : 140, Blood Flow Rate : 350-400, Dialysate Flow Rate : 600-700, Fluid Remove : As tolerated, Anticoagulation : No Heparin, Maintain SBP > 100 with NS, [facility] ER. Yvette Flavors alert/follows commands

## 2022-11-05 NOTE — ED PROVIDER NOTE - OBJECTIVE STATEMENT
Pt is a 28 y/o M PMHx asthma p/w laceration today.  Pt reports 2 hours ago, pt was walking, was distracted, looking at a cat, when he walked into a pole w/o loc.  Pt reports sustaining laceration at left eyebrow associated with oozing bleeding.  He reports he had aching pain at left eye brow, which has since resolved.  He reports that his mother cleansed wound with iodine.  He reports tetanus shot is not up to date.  Pt denies any fevers, chills, nausea, vomiting, neck pain, headache, changes in vision/hearing, numbness, weakness, dizziness, lightheadedness, use of blood thinners, ETOH abuse.

## 2022-11-05 NOTE — ED PROVIDER NOTE - PROGRESS NOTE DETAILS
ANITA JEFFERSON:  Laceration repair performed; procedure tolerated well.  Pt to receive adacel by RN.  Pt medically stable for discharge.  Strict return precautions given.

## 2022-11-05 NOTE — ED PROVIDER NOTE - PHYSICAL EXAMINATION
-Cranial Nerves:  --CN II: PERRLA  --CN III, IV, VI: EOMI b/l  --CN V1-3: Facial sensation intact to touch  --CN VII: No facial asymmetry or droop  --CN VIII: Hearing intact to rubbing fingers b/l  --CN IX, X: Palate elevates symmetrically. Normal phonation  --CN XI: Heading turning and shoulder shrug intact b/l  --CN XII: Tongue midline with normal movements     Normal bilateral FTN and HTS.  Rapid alternating movements intact.  Negative rhomberg.     +<0.5 cm laceration at left distal eyebrow; no active bleeding; no bony tenderness

## 2022-11-05 NOTE — ED PROVIDER NOTE - PATIENT PORTAL LINK FT
- - - You can access the FollowMyHealth Patient Portal offered by [facility] by registering at the following website: [email or web address]. By joining [employer]’s FollowMyHealth portal, you will also be able to view your health information using other applications (apps) compatible with our system.

## 2022-11-05 NOTE — ED PROVIDER NOTE - NSTIMEPROVIDERCAREINITIATE_GEN_ER
The patient is a 78 y/o male with a significant history of colon cancer 2000 sp partial colon resection, hypertension, hyperlipidemia, TIAs, CAD s/p MI/CABG 2014, AVR- bioprosthetic valve and AVR and MV endocarditis 2015 now on suppressive antibiotics (currently followed by ID -[facility]) who was told by [place] [facility] to return to ED for 7/11positive blood cultures 2/4 (one bottle GPC chain strep, one bottle GPC cluster staph). Patient then transferred to [facility] for further evaluation. Patient initially went to [place] ED on 7/11 with complaints of abdominal pain, nausea, and vomiting which started 3 days ago then found to have on CT abdomen  sigmoid diverticulitis then treated with cipro and metronidazole- resolved problem.    - MRSA and strep viridance on blood culture on 7.11.17.   - repeat blood culture on 7.13.17 show no growth   - echo 7/14/2017 show persistent mitral vegetation,same size as before, AV is stable with bioprosthesis    -picc line placed 7/17/2017  - plan for 6 weeks IV vancomycin through 8/18.  ID followed the patient.   KHALIDA showed known mitral valve vegetations, prosthetic valve without significant findings    was consulted and the patient was accepted to [facility] on 7/25.  He will be discharged to LTAC today.  Patient had issues of hyponatremia and will be sent with a fluid restriction until corrected.  Activity as per PT/OT. Patient needs to follow up on discharge with ID as well as GI for repeated out patient C-scope.  Diet- low NA.     05-Nov-2022 13:34

## 2023-03-22 ENCOUNTER — EMERGENCY (EMERGENCY)
Facility: HOSPITAL | Age: 28
LOS: 1 days | Discharge: HOME | End: 2023-03-22
Attending: STUDENT IN AN ORGANIZED HEALTH CARE EDUCATION/TRAINING PROGRAM | Admitting: STUDENT IN AN ORGANIZED HEALTH CARE EDUCATION/TRAINING PROGRAM
Payer: SELF-PAY

## 2023-03-22 VITALS
OXYGEN SATURATION: 100 % | TEMPERATURE: 98 F | HEART RATE: 52 BPM | SYSTOLIC BLOOD PRESSURE: 145 MMHG | DIASTOLIC BLOOD PRESSURE: 92 MMHG | RESPIRATION RATE: 18 BRPM

## 2023-03-22 VITALS
SYSTOLIC BLOOD PRESSURE: 121 MMHG | HEART RATE: 59 BPM | OXYGEN SATURATION: 100 % | RESPIRATION RATE: 19 BRPM | TEMPERATURE: 99 F | DIASTOLIC BLOOD PRESSURE: 84 MMHG

## 2023-03-22 PROCEDURE — 99284 EMERGENCY DEPT VISIT MOD MDM: CPT

## 2023-03-22 RX ORDER — GABAPENTIN 400 MG/1
1 CAPSULE ORAL
Qty: 14 | Refills: 0
Start: 2023-03-22 | End: 2023-03-28

## 2023-03-22 RX ORDER — GABAPENTIN 400 MG/1
300 CAPSULE ORAL ONCE
Refills: 0 | Status: COMPLETED | OUTPATIENT
Start: 2023-03-22 | End: 2023-03-22

## 2023-03-22 RX ORDER — VALACYCLOVIR 500 MG/1
1000 TABLET, FILM COATED ORAL ONCE
Refills: 0 | Status: COMPLETED | OUTPATIENT
Start: 2023-03-22 | End: 2023-03-22

## 2023-03-22 RX ORDER — VALACYCLOVIR 500 MG/1
1 TABLET, FILM COATED ORAL
Qty: 21 | Refills: 0
Start: 2023-03-22 | End: 2023-03-28

## 2023-03-22 RX ORDER — KETOROLAC TROMETHAMINE 30 MG/ML
60 SYRINGE (ML) INJECTION ONCE
Refills: 0 | Status: DISCONTINUED | OUTPATIENT
Start: 2023-03-22 | End: 2023-03-22

## 2023-03-22 RX ADMIN — GABAPENTIN 300 MILLIGRAM(S): 400 CAPSULE ORAL at 14:07

## 2023-03-22 RX ADMIN — Medication 60 MILLIGRAM(S): at 14:27

## 2023-03-22 RX ADMIN — VALACYCLOVIR 1000 MILLIGRAM(S): 500 TABLET, FILM COATED ORAL at 15:01

## 2023-03-22 NOTE — ED PROVIDER NOTE - SKIN COLOR
shingles noted on on the cervical spine, erythematous rash on a base. no obvious signs of infection, no erythema warmth or tenderness. shingles noted on on the cervical spine, erythematous rash on a base. no obvious signs of infection, no erythema warmth or tenderness.1 bump noted on the head, no obvious signs of infections.

## 2023-03-22 NOTE — ED ADULT TRIAGE NOTE - CHIEF COMPLAINT QUOTE
Presents to ED c/o blotchy patches of small red bumps on R side of chest, R shoulder, and posterior neck. Pt c/o shooting pain along the rash. Hx of chicken pox infection.

## 2023-03-22 NOTE — ED PROVIDER NOTE - NS ED ATTENDING STATEMENT MOD
This was a shared visit with the BUZZ. I reviewed and verified the documentation and independently performed the documented:

## 2023-03-22 NOTE — ED PROVIDER NOTE - CLINICAL SUMMARY MEDICAL DECISION MAKING FREE TEXT BOX
This is a 28-year-old male with no past medical history presented to the ED complaining having a rash on his right back wraparound to his neck and his shoulder.  Shingles-rash noted, no obvious signs of super infection. will give gabapentin, valtrex, This is a 28-year-old male with no past medical history presented to the ED complaining having a rash on his right back wraparound to his neck and his shoulder.  Shingles-rash noted, no obvious signs of super infection. will give gabapentin, valtrex,. Bumps will recommend f/u with neurosurgery. This is a 28-year-old male with no past medical history presented to the ED complaining having a rash on his right back wraparound to his neck and his shoulder.  Shingles-rash noted, no obvious signs of super infection. will give gabapentin, valtrex,. Bumps will recommend f/u with neurosurgery.      ===================================  attending mdm (Last Buchanan MD; attending emergency medicine and medical toxicology)    Is a 28-year-old male no past medical history is presenting to the emergency department with rash as above.  Rash is consistent with shingles.  Is in the distribution of the right neck and posterior neck.  Patient was given empiric gabapentin as well as acyclovir.  Counseled patient extensively on treatment duration as well as precautions for transmission of varicella virus.  Also counseled patient on pain control.  Patient also said he had a bony growth in his right occipital school that he has had for years.  Physical exam revealed a firm nonmobile mobile growth.  This is consistent with an osteoma.  No imaging indicated at this time patient is to follow-up with neurosurgery for osteoma

## 2023-03-22 NOTE — ED PROVIDER NOTE - OBJECTIVE STATEMENT
This is a 28-year-old male with no past medical history presented to the ED complaining having a rash on his right back wraparound to his neck and his shoulder.  States that the rash has been there for a few days very painful shooting pain.  He denies having any numbness or tingling denies any nausea vomiting denies have any fever chills body aches headaches or dizziness.  Denies having any dysuria hematuria urinary urgency and frequency.  He is been eating and drink without any difficulty.  Patient states that he had chickenpox as a kid never had a outbreak for shingles previously. This is a 28-year-old male with no past medical history presented to the ED complaining having a rash on his right back wraparound to his neck and his shoulder.  States that the rash has been there for a few days very painful shooting pain.  He denies having any numbness or tingling denies any nausea vomiting denies have any fever chills body aches headaches or dizziness.  Denies having any dysuria hematuria urinary urgency and frequency.  He is been eating and drink without any difficulty.  Patient states that he had chickenpox as a kid never had a outbreak for shingles previously. Pt states that he has bumps on his head which he has had for 10+ years. no pain or tenderness.

## 2023-03-22 NOTE — ED PROVIDER NOTE - ATTENDING APP SHARED VISIT CONTRIBUTION OF CARE
I (Chanel) agree with above, I performed a history and physical. Counseled rhett medical staff, physician assistant, and/or medical student on medical decision making as documented. Medical decisions and treatment interventions were made in real time during the patient encounter. Additionally and/or with the following exceptions:  the patient .Is a 28-year-old male no past medical history is presenting to the emergency department with rash as above.  Rash is consistent with shingles.  Is in the distribution of the right neck and posterior neck.  Patient was given empiric gabapentin as well as acyclovir.  Counseled patient extensively on treatment duration as well as precautions for transmission of varicella virus.  Also counseled patient on pain control.  Patient also said he had a bony growth in his right occipital school that he has had for years.  Physical exam revealed a firm nonmobile mobile growth.  This is consistent with an osteoma.  No imaging indicated at this time patient is to follow-up with neurosurgery for osteoma

## 2023-03-22 NOTE — ED PROVIDER NOTE - PATIENT PORTAL LINK FT
You can access the FollowMyHealth Patient Portal offered by [facility] by registering at the following website: [email or web address]. By joining [employer]’s FollowMyHealth portal, you will also be able to view your health information using other applications (apps) compatible with our system.

## 2023-04-05 ENCOUNTER — EMERGENCY (EMERGENCY)
Facility: HOSPITAL | Age: 28
LOS: 1 days | Discharge: HOME | End: 2023-04-05
Admitting: STUDENT IN AN ORGANIZED HEALTH CARE EDUCATION/TRAINING PROGRAM
Payer: COMMERCIAL

## 2023-04-05 VITALS
SYSTOLIC BLOOD PRESSURE: 139 MMHG | HEART RATE: 73 BPM | RESPIRATION RATE: 18 BRPM | OXYGEN SATURATION: 99 % | DIASTOLIC BLOOD PRESSURE: 88 MMHG | TEMPERATURE: 98 F

## 2023-04-05 PROCEDURE — 99285 EMERGENCY DEPT VISIT HI MDM: CPT

## 2023-04-05 NOTE — ED ADULT TRIAGE NOTE - CHIEF COMPLAINT QUOTE
Pt st" I punched my bed hurt rt hand...have a lot of pain in 5th knuckle and pinky feels numb....just happened." + swelling rt hand small laceration noticed on 5th knuckle. Pt appears  uncomfortable.   Denies med hx.

## 2023-04-06 VITALS
RESPIRATION RATE: 18 BRPM | TEMPERATURE: 98 F | DIASTOLIC BLOOD PRESSURE: 66 MMHG | HEART RATE: 61 BPM | SYSTOLIC BLOOD PRESSURE: 111 MMHG | OXYGEN SATURATION: 100 %

## 2023-04-06 PROBLEM — Z00.00 ENCOUNTER FOR PREVENTIVE HEALTH EXAMINATION: Status: ACTIVE | Noted: 2023-04-06

## 2023-04-06 PROCEDURE — 73130 X-RAY EXAM OF HAND: CPT | Mod: 26,RT

## 2023-04-06 RX ORDER — IBUPROFEN 200 MG
600 TABLET ORAL ONCE
Refills: 0 | Status: COMPLETED | OUTPATIENT
Start: 2023-04-06 | End: 2023-04-06

## 2023-04-06 RX ORDER — TETANUS TOXOID, REDUCED DIPHTHERIA TOXOID AND ACELLULAR PERTUSSIS VACCINE, ADSORBED 5; 2.5; 8; 8; 2.5 [IU]/.5ML; [IU]/.5ML; UG/.5ML; UG/.5ML; UG/.5ML
0.5 SUSPENSION INTRAMUSCULAR ONCE
Refills: 0 | Status: COMPLETED | OUTPATIENT
Start: 2023-04-06 | End: 2023-04-06

## 2023-04-06 RX ORDER — CEPHALEXIN 500 MG
1 CAPSULE ORAL
Qty: 20 | Refills: 0
Start: 2023-04-06 | End: 2023-04-15

## 2023-04-06 RX ORDER — ACETAMINOPHEN 500 MG
650 TABLET ORAL ONCE
Refills: 0 | Status: COMPLETED | OUTPATIENT
Start: 2023-04-06 | End: 2023-04-06

## 2023-04-06 RX ORDER — CEFAZOLIN SODIUM 1 G
2000 VIAL (EA) INJECTION ONCE
Refills: 0 | Status: COMPLETED | OUTPATIENT
Start: 2023-04-06 | End: 2023-04-06

## 2023-04-06 RX ADMIN — TETANUS TOXOID, REDUCED DIPHTHERIA TOXOID AND ACELLULAR PERTUSSIS VACCINE, ADSORBED 0.5 MILLILITER(S): 5; 2.5; 8; 8; 2.5 SUSPENSION INTRAMUSCULAR at 04:40

## 2023-04-06 RX ADMIN — Medication 600 MILLIGRAM(S): at 00:52

## 2023-04-06 RX ADMIN — Medication 100 MILLIGRAM(S): at 04:15

## 2023-04-06 RX ADMIN — Medication 650 MILLIGRAM(S): at 04:49
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
## 2023-04-06 NOTE — ED ADULT NURSE REASSESSMENT NOTE - NS ED NURSE REASSESS COMMENT FT1
Pt appears to be resting comfortably, NAD, VS noted, respirations are even and unlabored, awaiting for Ortho consult. Safety precautions implemented as per protocol, awaiting further MD orders, will continue  with plan of care.

## 2023-04-06 NOTE — ED PROVIDER NOTE - PROGRESS NOTE DETAILS
ANITA Mace: fx noted on xray, hand called for consult(orthopedics covering). ANITA Mace: pt seen and splinted by Orthopedics, advised to give Ancef 2 grams IV x 1 and discharge home on keflex 500mg BID x 10 days, follow up with Dr Lugo.  Will update Tetanus pt states he does not recall when he had it last.  Discharge and results reviewed with patient.  Pt advised his injury requires surgery and the importance of follow up.

## 2023-04-06 NOTE — ED PROVIDER NOTE - CARE PROVIDER_API CALL
Nadya Lugo)  [address], [facility] [address]  [place] [address]  Phone: [phone]  Fax: [phone]  Follow Up Time:

## 2023-04-06 NOTE — ED PROVIDER NOTE - PHYSICAL EXAMINATION
Right hand: 2+ pulses, +TTP at 5th MCP with swelling, superficial abrasion noted over 5th knuckle, sensation intact, decreased ROM.  Wrist is non tender with FROM. Right hand: 2+ pulses, +TTP at 5th MCP with swelling, superficial abrasion noted over 5th knuckle no active bleeding, sensation intact, decreased ROM.  Wrist is non tender with FROM.

## 2023-04-06 NOTE — CONSULT NOTE ADULT - ASSESSMENT
ASSESSMENT & PLAN:  28y Male with  Right displaced 5th metaparcal neck fracture . Patient is stable from an orthopaedic standpoint    -Pain control as needed  - NWB RUE  - Keep splint on, clean and dry  - Keflex x 10days  - Follow-up with Dr. Lugo within 1 week.  - Patient aware of operative nature of his injury  and the importance of following up with hand surgery    Orthopaedic Surgery  [facility] [id number]  [facility]        [id number]  [facility]  [id number]/ [phone]

## 2023-04-06 NOTE — ED ADULT NURSE NOTE - CAS EDN DISCHARGE ASSESSMENT
Alert and oriented to person, place and time Burow's Graft Text: Given the location of the defect, shape of the defect, the proximity to free margins and the presence of a standing cone deformity a Burow's skin graft was deemed most appropriate. The risks, benefits, and possible outcomes of this procedure were discussed along with the risks, benefits, and possible outcomes of other options for wound repair (including but not limited to: variations of complex closure, flaps, other types of grafts, and second intention). The patient verbalized understanding and consent to the outlined procedure. The patient understands should the surgical site heal in a manner they find unsatisfactory further interventions or referral to an additional specialist may be required. The graft donor and recipient site were prepped and draped in sterile fashion. Anesthesia was checked and supplemented as necessary. The standing cone was removed and this tissue was then trimmed to the shape of the primary defect. The adipose tissue was also removed until only dermis and epidermis were left.  The skin margins of the secondary defect were undermined to an appropriate distance in all directions utilizing iris scissors.  The secondary defect was closed with interrupted buried subcutaneous sutures.  The skin graft was then placed in the primary defect and oriented appropriately. The graft was sutured into place with simple interrupted and simple running cutaneous sutures.

## 2023-04-06 NOTE — ED PROVIDER NOTE - PATIENT PORTAL LINK FT
You can access the FollowMyHealth Patient Portal offered by [facility] by registering at the following website: [email or web address]. By joining [employer]’s FollowMyHealth portal, you will also be able to view your health information using other applications (apps) compatible with our system.

## 2023-04-06 NOTE — CONSULT NOTE ADULT - SUBJECTIVE AND OBJECTIVE BOX
28y Male presents with Right hand pain sp punching hi headboard. Patient is RIGHT hand dominant. Patient ambulates without assistance at baseline.  Endorses tingling index, middle, ring and small fingers.    PAST MEDICAL & SURGICAL HISTORY:  Asthma      No significant past surgical history        Home Medications:    Allergies    No Known Allergies    Intolerances                    VITALS  Vital Signs Last 24 Hrs  T(C): 36.9 (06 Apr 2023 04:49), Max: 36.9 (05 Apr 2023 23:49)  T(F): 98.5 (06 Apr 2023 04:49), Max: 98.5 (06 Apr 2023 04:49)  HR: 61 (06 Apr 2023 04:49) (60 - 73)  BP: 111/66 (06 Apr 2023 04:49) (111/66 - 139/88)  BP(mean): --  RR: 18 (06 Apr 2023 04:49) (16 - 18)  SpO2: 100% (06 Apr 2023 04:49) (99% - 100%)    Parameters below as of 06 Apr 2023 04:49  Patient On (Oxygen Delivery Method): room air        PHYSICAL EXAM  General: NAD, Awake and Alert, resting comfortably  Resp: Non-labored breathing, No accessory muscle use  Right hand  small poke hole over the dorsal mcp  hand swollen more prominently over the 5th MCP  5th MCP ROM pain limited  AINPIN/U intact  SILT M/U/R  great cap refills    Patient received IV antibiotics and tetanus vaccine in ED    Closed reduction attempt was made with the patient tolerated well. A well padded intrinsic plus splint was placed

## 2023-04-06 NOTE — ED PROVIDER NOTE - CPE EDP MUSC NORM
Plan: Clean buttock with soap and water, pat dry then apply triad hydrophilic dressing   Pressure ulcer preventive  measures  skin care   Asses wound and inform primary provider of any changes   Case discussed with primary Rn  Wound/ ostomy specialist  to f/u as needed     Offloading: [ x] Frequent position changes [ ] Devices/Equipment  Cleansing: [ ] Saline [x ] Soap/Water [ ] Other: ______  Topicals: [x ] Barrier Cream [ ] Antimicrobial [ ] Enzymatic Wound Debridement  Dressings: [ ] Dry, sterile [ ] Foam [ ] Absorbant Pads [ ] Collagenase      
- - -
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
## 2023-04-06 NOTE — ED PROVIDER NOTE - NSFOLLOWUPINSTRUCTIONS_ED_ALL_ED_FT
Follow up with your Primary Medical Doctor in 1-2 days.  Follow up with Hand Specialist Dr Lugo in 1-2 days call to schedule an appointment [phone].  You need to follow up as soon as possible as your injury requires surgery.  Keep splint clean dry and intact.  Take Ibuprofen 600mg orally every 8 hours as needed for pain take with food.  Take Keflex 500mg orally 2 x a day x 10 days.  Elevate hand.  Ice 3-4 x a day x 48 hours.  Return to the ER for any persistent/worsening or new symptoms fevers, chills, weakness, numbness, tingling or any concerning symptoms.
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
## 2023-04-07 ENCOUNTER — APPOINTMENT (OUTPATIENT)
Dept: ORTHOPEDIC SURGERY | Facility: CLINIC | Age: 28
End: 2023-04-07
Payer: COMMERCIAL

## 2023-04-07 ENCOUNTER — NON-APPOINTMENT (OUTPATIENT)
Age: 28
End: 2023-04-07

## 2023-04-07 VITALS
SYSTOLIC BLOOD PRESSURE: 155 MMHG | HEIGHT: 75 IN | HEART RATE: 63 BPM | WEIGHT: 200 LBS | BODY MASS INDEX: 24.87 KG/M2 | OXYGEN SATURATION: 98 % | DIASTOLIC BLOOD PRESSURE: 91 MMHG

## 2023-04-07 DIAGNOSIS — S62.336B: ICD-10-CM

## 2023-04-07 PROCEDURE — 29075 APPL CST ELBW FNGR SHORT ARM: CPT | Mod: RT

## 2023-04-07 PROCEDURE — 99204 OFFICE O/P NEW MOD 45 MIN: CPT | Mod: 25

## 2023-04-10 ENCOUNTER — OUTPATIENT (OUTPATIENT)
Dept: OUTPATIENT SERVICES | Facility: HOSPITAL | Age: 28
LOS: 1 days | End: 2023-04-10
Payer: COMMERCIAL

## 2023-04-10 ENCOUNTER — TRANSCRIPTION ENCOUNTER (OUTPATIENT)
Age: 28
End: 2023-04-10

## 2023-04-10 VITALS
HEIGHT: 75 IN | SYSTOLIC BLOOD PRESSURE: 143 MMHG | RESPIRATION RATE: 16 BRPM | TEMPERATURE: 98 F | DIASTOLIC BLOOD PRESSURE: 84 MMHG | HEART RATE: 73 BPM | WEIGHT: 205.91 LBS | OXYGEN SATURATION: 98 %

## 2023-04-10 DIAGNOSIS — S62.336A DISPLACED FRACTURE OF NECK OF FIFTH METACARPAL BONE, RIGHT HAND, INITIAL ENCOUNTER FOR CLOSED FRACTURE: ICD-10-CM

## 2023-04-10 DIAGNOSIS — S62.336D DISPLACED FRACTURE OF NECK OF FIFTH METACARPAL BONE, RIGHT HAND, SUBSEQUENT ENCOUNTER FOR FRACTURE WITH ROUTINE HEALING: ICD-10-CM

## 2023-04-10 DIAGNOSIS — Z01.818 ENCOUNTER FOR OTHER PREPROCEDURAL EXAMINATION: ICD-10-CM

## 2023-04-10 LAB
HCT VFR BLD CALC: 40.2 % — SIGNIFICANT CHANGE UP (ref 39–50)
HGB BLD-MCNC: 13 G/DL — SIGNIFICANT CHANGE UP (ref 13–17)
MCHC RBC-ENTMCNC: 26.7 PG — LOW (ref 27–34)
MCHC RBC-ENTMCNC: 32.3 GM/DL — SIGNIFICANT CHANGE UP (ref 32–36)
MCV RBC AUTO: 82.5 FL — SIGNIFICANT CHANGE UP (ref 80–100)
NRBC # BLD: 0 /100 WBCS — SIGNIFICANT CHANGE UP (ref 0–0)
PLATELET # BLD AUTO: 253 K/UL — SIGNIFICANT CHANGE UP (ref 150–400)
RBC # BLD: 4.87 M/UL — SIGNIFICANT CHANGE UP (ref 4.2–5.8)
RBC # FLD: 13 % — SIGNIFICANT CHANGE UP (ref 10.3–14.5)
WBC # BLD: 5.18 K/UL — SIGNIFICANT CHANGE UP (ref 3.8–10.5)
WBC # FLD AUTO: 5.18 K/UL — SIGNIFICANT CHANGE UP (ref 3.8–10.5)

## 2023-04-10 PROCEDURE — G0463: CPT

## 2023-04-10 PROCEDURE — 85027 COMPLETE CBC AUTOMATED: CPT

## 2023-04-10 RX ORDER — ACETAMINOPHEN 500 MG/1
500 TABLET ORAL EVERY 6 HOURS
Qty: 56 | Refills: 0 | Status: ACTIVE | COMMUNITY
Start: 2023-04-10 | End: 1900-01-01

## 2023-04-10 RX ORDER — OXYCODONE 5 MG/1
5 TABLET ORAL
Qty: 10 | Refills: 0 | Status: ACTIVE | COMMUNITY
Start: 2023-04-10 | End: 1900-01-01

## 2023-04-10 RX ORDER — SODIUM CHLORIDE 9 MG/ML
1000 INJECTION, SOLUTION INTRAVENOUS
Refills: 0 | Status: DISCONTINUED | OUTPATIENT
Start: 2023-04-11 | End: 2023-04-26

## 2023-04-10 RX ORDER — IBUPROFEN 800 MG/1
800 TABLET, FILM COATED ORAL EVERY 8 HOURS
Qty: 42 | Refills: 0 | Status: ACTIVE | COMMUNITY
Start: 2023-04-10 | End: 1900-01-01

## 2023-04-10 RX ORDER — SODIUM CHLORIDE 9 MG/ML
3 INJECTION INTRAMUSCULAR; INTRAVENOUS; SUBCUTANEOUS EVERY 8 HOURS
Refills: 0 | Status: DISCONTINUED | OUTPATIENT
Start: 2023-04-11 | End: 2023-04-26
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
## 2023-04-10 NOTE — H&P PST ADULT - PROBLEM SELECTOR PLAN 1
scheduled right fifth metacarpal, open reduction  internal fixation on 4/11/2023.  -preop instructions given  -labs: CBC

## 2023-04-11 ENCOUNTER — APPOINTMENT (OUTPATIENT)
Dept: ORTHOPEDIC SURGERY | Facility: HOSPITAL | Age: 28
End: 2023-04-11

## 2023-04-11 ENCOUNTER — TRANSCRIPTION ENCOUNTER (OUTPATIENT)
Age: 28
End: 2023-04-11

## 2023-04-11 ENCOUNTER — OUTPATIENT (OUTPATIENT)
Dept: OUTPATIENT SERVICES | Facility: HOSPITAL | Age: 28
LOS: 1 days | End: 2023-04-11
Payer: COMMERCIAL

## 2023-04-11 VITALS
OXYGEN SATURATION: 100 % | HEART RATE: 52 BPM | RESPIRATION RATE: 14 BRPM | DIASTOLIC BLOOD PRESSURE: 76 MMHG | SYSTOLIC BLOOD PRESSURE: 127 MMHG

## 2023-04-11 VITALS
HEIGHT: 75 IN | HEART RATE: 59 BPM | SYSTOLIC BLOOD PRESSURE: 131 MMHG | DIASTOLIC BLOOD PRESSURE: 85 MMHG | WEIGHT: 205.91 LBS | TEMPERATURE: 98 F | OXYGEN SATURATION: 99 % | RESPIRATION RATE: 15 BRPM

## 2023-04-11 DIAGNOSIS — S62.336D DISPLACED FRACTURE OF NECK OF FIFTH METACARPAL BONE, RIGHT HAND, SUBSEQUENT ENCOUNTER FOR FRACTURE WITH ROUTINE HEALING: ICD-10-CM

## 2023-04-11 PROCEDURE — 26615 TREAT METACARPAL FRACTURE: CPT | Mod: RT

## 2023-04-11 PROCEDURE — 26615 TREAT METACARPAL FRACTURE: CPT | Mod: F9

## 2023-04-11 PROCEDURE — C1713: CPT

## 2023-04-11 DEVICE — IMPLANTABLE DEVICE: Type: IMPLANTABLE DEVICE | Status: FUNCTIONAL

## 2023-04-11 DEVICE — KWIRE 1.1MMX15.2CM: Type: IMPLANTABLE DEVICE | Status: FUNCTIONAL

## 2023-04-11 RX ORDER — CEFAZOLIN SODIUM 1 G
2000 VIAL (EA) INJECTION ONCE
Refills: 0 | Status: COMPLETED | OUTPATIENT
Start: 2023-04-11 | End: 2023-04-11

## 2023-04-11 RX ORDER — OXYCODONE HYDROCHLORIDE 5 MG/1
5 TABLET ORAL ONCE
Refills: 0 | Status: DISCONTINUED | OUTPATIENT
Start: 2023-04-11 | End: 2023-04-11

## 2023-04-11 RX ORDER — ACETAMINOPHEN 500 MG
1000 TABLET ORAL ONCE
Refills: 0 | Status: COMPLETED | OUTPATIENT
Start: 2023-04-11 | End: 2023-04-11

## 2023-04-11 RX ORDER — ONDANSETRON 8 MG/1
4 TABLET, FILM COATED ORAL ONCE
Refills: 0 | Status: DISCONTINUED | OUTPATIENT
Start: 2023-04-11 | End: 2023-04-26

## 2023-04-11 RX ORDER — HYDROMORPHONE HYDROCHLORIDE 2 MG/ML
1 INJECTION INTRAMUSCULAR; INTRAVENOUS; SUBCUTANEOUS ONCE
Refills: 0 | Status: DISCONTINUED | OUTPATIENT
Start: 2023-04-11 | End: 2023-04-11

## 2023-04-11 RX ORDER — LIDOCAINE HCL 20 MG/ML
0.2 VIAL (ML) INJECTION ONCE
Refills: 0 | Status: COMPLETED | OUTPATIENT
Start: 2023-04-11 | End: 2023-04-11

## 2023-04-11 RX ADMIN — SODIUM CHLORIDE 3 MILLILITER(S): 9 INJECTION INTRAMUSCULAR; INTRAVENOUS; SUBCUTANEOUS at 16:06

## 2023-04-11 RX ADMIN — HYDROMORPHONE HYDROCHLORIDE 1 MILLIGRAM(S): 2 INJECTION INTRAMUSCULAR; INTRAVENOUS; SUBCUTANEOUS at 17:15

## 2023-04-11 RX ADMIN — SODIUM CHLORIDE 100 MILLILITER(S): 9 INJECTION, SOLUTION INTRAVENOUS at 13:18

## 2023-04-11 RX ADMIN — SODIUM CHLORIDE 100 MILLILITER(S): 9 INJECTION, SOLUTION INTRAVENOUS at 16:56

## 2023-04-11 RX ADMIN — Medication 400 MILLIGRAM(S): at 16:25

## 2023-04-11 RX ADMIN — HYDROMORPHONE HYDROCHLORIDE 1 MILLIGRAM(S): 2 INJECTION INTRAMUSCULAR; INTRAVENOUS; SUBCUTANEOUS at 16:56

## 2023-04-11 RX ADMIN — SODIUM CHLORIDE 3 MILLILITER(S): 9 INJECTION INTRAMUSCULAR; INTRAVENOUS; SUBCUTANEOUS at 13:14

## 2023-04-11 RX ADMIN — Medication 1000 MILLIGRAM(S): at 16:50

## 2023-04-11 NOTE — PRE-ANESTHESIA EVALUATION ADULT - WEIGHT IN KG
Problem: Adult Inpatient Plan of Care  Goal: Plan of Care Review  Outcome: Ongoing, Progressing  Flowsheets (Taken 6/19/2022 0521)  Plan of Care Reviewed With:   patient   spouse  Goal: Patient-Specific Goal (Individualized)  Outcome: Ongoing, Progressing  Flowsheets (Taken 6/19/2022 0521)  Anxieties, Fears or Concerns: LVAD  Individualized Care Needs: IABP  Patient-Specific Goals (Include Timeframe): CVP <13 for remainder of stay  Goal: Absence of Hospital-Acquired Illness or Injury  Outcome: Ongoing, Progressing  Intervention: Identify and Manage Fall Risk  Flowsheets (Taken 6/19/2022 0521)  Safety Promotion/Fall Prevention:   assistive device/personal item within reach   toileting scheduled   instructed to call staff for mobility   family to remain at bedside  Intervention: Prevent Skin Injury  Flowsheets (Taken 6/19/2022 0521)  Body Position:   position changed independently   weight shifting  Skin Protection:   skin-to-skin areas padded   transparent dressing maintained   tubing/devices free from skin contact   skin sealant/moisture barrier applied   skin-to-device areas padded   adhesive use limited  Intervention: Prevent and Manage VTE (Venous Thromboembolism) Risk  Flowsheets (Taken 6/19/2022 0521)  Activity Management: Rolling - L1  VTE Prevention/Management:   bleeding precations maintained   bleeding risk assessed   ROM (active) performed   dorsiflexion/plantar flexion performed  Range of Motion: active ROM (range of motion) encouraged  Intervention: Prevent Infection  Flowsheets (Taken 6/19/2022 0521)  Infection Prevention:   cohorting utilized   environmental surveillance performed   rest/sleep promoted   single patient room provided   visitors restricted/screened   equipment surfaces disinfected   hand hygiene promoted   personal protective equipment utilized  Goal: Optimal Comfort and Wellbeing  Outcome: Ongoing, Progressing  Intervention: Monitor Pain and Promote Comfort  Flowsheets (Taken  6/19/2022 0521)  Pain Management Interventions:   pillow support provided   quiet environment facilitated  Intervention: Provide Person-Centered Care  Flowsheets (Taken 6/19/2022 0521)  Trust Relationship/Rapport: care explained  Goal: Readiness for Transition of Care  Outcome: Ongoing, Progressing     Problem: Diabetes Comorbidity  Goal: Blood Glucose Level Within Targeted Range  Outcome: Ongoing, Progressing  Intervention: Monitor and Manage Glycemia  Flowsheets (Taken 6/19/2022 0521)  Glycemic Management: blood glucose monitored     Problem: Infection  Goal: Absence of Infection Signs and Symptoms  Outcome: Ongoing, Progressing  Intervention: Prevent or Manage Infection  Flowsheets (Taken 6/19/2022 0521)  Fever Reduction/Comfort Measures:   lightweight bedding   lightweight clothing  Infection Management: aseptic technique maintained  Isolation Precautions:   precautions maintained   protective     Problem: Fall Injury Risk  Goal: Absence of Fall and Fall-Related Injury  Outcome: Ongoing, Progressing  Intervention: Identify and Manage Contributors  Flowsheets (Taken 6/19/2022 0521)  Self-Care Promotion: independence encouraged  Medication Review/Management: medications reviewed  Intervention: Promote Injury-Free Environment  Flowsheets (Taken 6/19/2022 0521)  Safety Promotion/Fall Prevention:   assistive device/personal item within reach   toileting scheduled   instructed to call staff for mobility   family to remain at bedside     Problem: Skin Injury Risk Increased  Goal: Skin Health and Integrity  Outcome: Ongoing, Progressing     Problem: Coping Ineffective  Goal: Effective Coping  Outcome: Ongoing, Progressing      93.4

## 2023-04-11 NOTE — PRE-ANESTHESIA EVALUATION ADULT - NSANTHPMHFT_GEN_ALL_CORE
Shingles 1 month ago. Pt. denies pain or weeping lesions at present.  Childhood asthma, years since last inhaler use

## 2023-04-11 NOTE — ASU DISCHARGE PLAN (ADULT/PEDIATRIC) - NURSING INSTRUCTIONS
OK to take Tylenol/Acetaminophen at _11pm _____ for pain and every 6 hours after as needed. OK to take Motrin/Ibuprofen at _950pm____ for pain and every 6 hours after as needed.

## 2023-04-11 NOTE — ASU DISCHARGE PLAN (ADULT/PEDIATRIC) - CARE PROVIDER_API CALL
Nadya Lugo)  [address]  [place] [address]  Phone: [phone]  Fax: [phone]  Established Patient  Follow Up Time: 1 week

## 2023-04-11 NOTE — ASU DISCHARGE PLAN (ADULT/PEDIATRIC) - NS MD DC FALL RISK RISK
For information on Fall & Injury Prevention, visit: [email or web address] OR  [email or web address] OR  https://www.cdc.gov/steadi/patient.html

## 2023-04-11 NOTE — ASU PATIENT PROFILE, ADULT - HAVE YOU HAD COVID IN THE LAST 60 DAYS?
Return to Work  2019     Seen today: yes    Patient:  Elina Hood  :   1979  MRN:     [id number]  Physician: NELSON MEDRANO    Elina Hood may return to work with no restrictions.      Patient limitations:  None.        Fax to: [phone]      Electronically signed by Nelson Medrano MD             No

## 2023-04-11 NOTE — PRE-ANESTHESIA EVALUATION ADULT - NSANTHOSAYNRD_GEN_A_CORE
No. CHAKA screening performed.  STOP BANG Legend: 0-2 = LOW Risk; 3-4 = INTERMEDIATE Risk; 5-8 = HIGH Risk

## 2023-04-21 ENCOUNTER — APPOINTMENT (OUTPATIENT)
Dept: ORTHOPEDIC SURGERY | Facility: CLINIC | Age: 28
End: 2023-04-21
Payer: COMMERCIAL

## 2023-04-21 PROCEDURE — 99024 POSTOP FOLLOW-UP VISIT: CPT

## 2023-04-21 PROCEDURE — 73130 X-RAY EXAM OF HAND: CPT | Mod: RT

## 2023-05-10 ENCOUNTER — APPOINTMENT (OUTPATIENT)
Dept: ORTHOPEDIC SURGERY | Facility: CLINIC | Age: 28
End: 2023-05-10

## 2023-05-11 PROBLEM — B02.9 ZOSTER WITHOUT COMPLICATIONS: Chronic | Status: ACTIVE | Noted: 2023-04-10

## 2023-05-24 ENCOUNTER — APPOINTMENT (OUTPATIENT)
Dept: ORTHOPEDIC SURGERY | Facility: CLINIC | Age: 28
End: 2023-05-24
Payer: COMMERCIAL

## 2023-06-07 ENCOUNTER — APPOINTMENT (OUTPATIENT)
Dept: ORTHOPEDIC SURGERY | Facility: CLINIC | Age: 28
End: 2023-06-07
Payer: COMMERCIAL

## 2023-06-07 DIAGNOSIS — Z98.890 OTHER SPECIFIED POSTPROCEDURAL STATES: ICD-10-CM

## 2023-06-07 DIAGNOSIS — S62.336D DISPLACED FRACTURE OF NECK OF FIFTH METACARPAL BONE, RIGHT HAND, SUBSEQUENT ENCOUNTER FOR FRACTURE WITH ROUTINE HEALING: ICD-10-CM

## 2023-06-07 PROCEDURE — 99024 POSTOP FOLLOW-UP VISIT: CPT

## 2023-06-07 PROCEDURE — 73130 X-RAY EXAM OF HAND: CPT | Mod: RT

## 2024-02-12 NOTE — ED PROVIDER NOTE - CPE EDP ENMT NORM
Patient called wanting her provider to know that she doesn't have her machine.  It is at [facility] and she is trying to get a new one from them as hers is only a few months old and isn't working.  Was able to pull data from it and place in chart a download.  Patient coming from [facility] for appointment and wants to make sure everything is worked out and everyone knows she won't have her machine.     normal...

## 2024-02-19 NOTE — H&P PST ADULT - NEUROLOGICAL
[facility] Population - Proactive Outreach  Unable to Reach    Background: Patient outreach conducted proactively to support health maintenance initiatives within [facility]'s [facility] Population.     Outreach attempted x 1.  Unable to leave a voicemail, patient's phone did not go to voicemail.     Plan:  Care Coordinator will try to reach patient again in 1-2 business days.    Faith Sparks, ALESSANDRA  [facility]    negative normal/cranial nerves II-XII intact/sensation intact

## 2024-07-24 NOTE — H&P PST ADULT - NSICDXNOPASTSURGICALHX_GEN_ALL_CORE
WORKERS' COMPENSATION INITIAL NOTE    EMPLOYER: [employer]    DATE OF INJURY: 7/12/2024    CHIEF COMPLAINT:   Chief Complaint   Patient presents with   • Worker's Compensation     WRK UC 7/12/24 LEFT ANKLE [employer] LITTLE CHUTE   • Office Visit     HISTORY OF INJURY:  Ethan Tidwell is a 37 year old male, who complains of an injury that occurred while performing work activities at work.      He works at [employer] in the position of  and has been with this employer for 8 years.    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    Prior medical treatment, self-treatment or first aid includes: Walk-In/Urgent Care.    Compared to the worst the pain has been since the injury, patient reports minimal improvement at this time.    CURRENT MEDICATIONS:   Current medications were reviewed. Medications relevant to this injury as actually taken at this time are: none.    The specific location of pain or other symptoms is left ankle.    INITIAL VISIT: 7/24/2024   Patient presents for an Initial evaluation of left ankle injury. Patient states that the injury occurred on 7/12/2024 while he was at work as a  at [employer]. When the injury occurred, he was stepping out of his work truck when he stepped into a hole and inverted his left ankle.The following day he went to the urgent care, where x-ray images were taken and confirmed no osseous involvement.  He was instructed to rest, ice, elevate, compression and take over-the-counter anti-inflammatories.  He was given a restriction for 1 week of seated work only.  While at work he typically wears steel toed boots that rise above the level of the ankle.    He presents today with reports of continued pain over the medial and lateral malleolus and tightness throughout the posterior ankle.  He says it hurts more to apply downward pressure with the left foot.  He is independently ambulatory  today with a mildly antalgic gait.  There is minimal swelling below the lateral malleolus but otherwise unremarkable.  Tenderness with palpation over the medial and lateral malleolus.  Main test shows intact Achilles.  4 inch Ace wrap was applied and patient was instructed on use.  Recommend continued ice, elevation, compression.  Discussed stretches and handout was provided.  Encouraged early mobilization within reason.  Discussed risks risks of immobility.  Work restrictions were provided.  All questions were answered.  Exam was completed with use of video  as well as presence of patient's family member.    REVIEW OF SYSTEMS: A review of systems was performed specific to the work injury.    PHYSICAL EXAMINATION:  Vital Signs: Visit Vitals  BP (!) 140/88   Pulse 76   Resp 14   Ht 5' 8\" (1.727 m)   Wt 90.7 kg (200 lb)   BMI 30.41 kg/m²     Constitutional: Well developed, well nourished, no acute distress. Vitals above.   Psychiatric: Speech and behavior appropriate. Alert. Mood and affect appropriate   Respiratory: No respiratory distress  Musculoskeletal: Minimally antalgic gait.  LEFT ANKLE:  The ankle and foot have mild lateral swelling. There is no erythema. There is no fluctuance about the foot/ankle.    Gait is Antalgic .      TENDERNESS:  There is tenderness to palpation of the medial malleolus, lateral malleolus.    RANGE OF MOTION ACTIVE   Ankle dorsiflexion 15°   Ankle plantar flexion 45°   Subtalar inversion 30°   Subtalar eversion 20°     TESTING:  Main test: positive     [employer] [employer] [place] FOOT RULES:   1. Inability to bear weight for four steps, both immediately and in the office? No.  2. Bone tenderness at the navicular bone? No.  3. Bone tenderness at the base of the fifth metatarsal bone? No.  4. Bone tenderness at the posterior edge or tip of the lateral malleolus? No.  5. Bone tenderness at the posterior edge or tip of the medial malleolus? No.    There is a palpable, 2+ dorsalis  pedis pulse and the toes have brisk capillary refill. Sensation is intact to light touch in superficial peroneal, deep peroneal, and tibial nerve distribution. Monofilament testing is deferred..  The patient is able to actively plantarflex, dorsiflex, invert and jacquelin the hindfoot.    Examination of the contralateral ankle demonstrates normal range of motion, strength and stability.    ASSESSMENT:  1. Encounter related to worker's compensation claim    2. Sprain of left ankle, unspecified ligament, initial encounter      Work Relatedness: Based on patient's reported history, review of patient's chart, my physical evaluation and review of any additional diagnostic imaging or testing, this injury/illness is consistent with the mechanism of Injury as provided by the patient; therefore, this injury/illness is most likely \"work related\" by causation or by precipitation or aggravation.     MDM: Based on my review of the history, physical exam, interview with the patient, and review of diagnostic imagining, left ankle sprain. I want the patient to continue with ice, elevation, compression-4 inch Ace wrap was provided, discussed benefits of NSAIDs, reviewed handout with stretches for early mobilization. I will follow up with the patient in approximately 2 weeks to re-evaluate the injury status, potentially advance work restrictions, potentially discharge patient from care, and discuss next steps in the plan of care. Patient demonstrated understanding and agreement to the plan of care. All questions answered.      PLAN:  Activity Restrictions: These restrictions apply from this time (7/24/2024) forward.  - Must be able to elevate and ice the left ankle every 2-3 hours for 15 minutes increments  - Stop any activities the aggravate pain  - Wear ACE wrap and supportive shoes    Treatment/Medications:  Ibuprofen 200-400 mg every 6 hours OR naproxen 220 mg every 12 hours for pain.  Take with food and stay well hydrated when  taking this medication.  Do not continue to take this medication if it causes significant stomach upset.    Acetaminophen 1000 mg every 8 hours for pain, you can take this in addition to Naproxen or Ibuprofen.  Do not have more than 2 drinks a day if you are a male, or one drink a day if you are a female while taking Acetaminophen at this dose to avoid liver toxicity.     Do not combine other NSAID medications together (aleve, advil etc) .      Patient Instructions:  - ice  - elevation  - ankle stretches    Status: Initial visit   Patient is to follow restrictions as noted on the RTW letter.   Patient is to follow-up in approximately 2 weeks.    Initial call to employer: email requested, no status call    Notes are reviewed and cosigned by my collaborating physician in a reasonable timeframe.    Dr. Ilya Stewart   <-- Click to add NO significant Past Surgical History

## (undated) DEVICE — DRAPE TOWEL BLUE 17" X 24"

## (undated) DEVICE — VENODYNE/SCD SLEEVE CALF LARGE

## (undated) DEVICE — SUT MONOCRYL 4-0 18" P-3 UNDYED

## (undated) DEVICE — GLV 7 PROTEXIS (WHITE)

## (undated) DEVICE — KIT INSTRUMENT 4.5MM

## (undated) DEVICE — GLV 6 PROTEXIS (WHITE)

## (undated) DEVICE — DRAPE MAYO STAND 30"

## (undated) DEVICE — POSITIONER FOAM EGG CRATE ULNAR 2PCS (PINK)

## (undated) DEVICE — GLV 6.5 PROTEXIS (BLUE)

## (undated) DEVICE — ELCTR BIPOLAR CORD J&J 12FT DISP

## (undated) DEVICE — WARMING BLANKET LOWER ADULT

## (undated) DEVICE — SUT MONOSOF 5-0 18" P-13 UNDYED

## (undated) DEVICE — VISITEC 4X4

## (undated) DEVICE — DRSG STOCKINETTE TUBULAR SYNTHETIC 1PLY 3X36"

## (undated) DEVICE — SUT MONOSOF 5-0 18" P-13

## (undated) DEVICE — SOL IRR POUR NS 0.9% 500ML

## (undated) DEVICE — TOURNIQUET ESMARK 4"

## (undated) DEVICE — PACK HAND

## (undated) DEVICE — DRSG STERISTRIPS 0.5 X 4"

## (undated) DEVICE — TOURNIQUET CUFF 18" DUAL PORT SINGLE BLADDER W PLC  (BLACK)

## (undated) DEVICE — DRSG COBAN 1" LF NONSTERILE

## (undated) DEVICE — GOWN TRIMAX LG

## (undated) DEVICE — SPECIMEN CONTAINER 100ML

## (undated) DEVICE — DRSG STOCKINETTE IMPERVIOUS MED

## (undated) DEVICE — MEDICATION LABELS W MARKER

## (undated) DEVICE — BLADE SCALPEL SAFETYLOCK #15